# Patient Record
Sex: FEMALE
[De-identification: names, ages, dates, MRNs, and addresses within clinical notes are randomized per-mention and may not be internally consistent; named-entity substitution may affect disease eponyms.]

---

## 2023-06-10 ENCOUNTER — NURSE TRIAGE (OUTPATIENT)
Dept: OTHER | Facility: CLINIC | Age: 88
End: 2023-06-10

## 2023-06-10 NOTE — TELEPHONE ENCOUNTER
Location of patient: [place]    Subjective: Caller states \"fell on Tuesday\" Daughter, Alba Quesada, is calling, patient is nearby. Current Symptoms: Was seen by the doctor the day after she fell and was given a pain shot. Patient has back pain in her whole back. Hurts to take a deep breath in her back. Associated Symptoms: reduced activity    Pain Severity: 10/10; sharp; intermittent with movement    Temperature: denies fever     What has been tried: tramadol, tylenol    Recommended disposition: See HCP (or PCP triage) within 4 hours    Care advice provided, patient verbalizes understanding; denies any other questions or concerns. Outcome: Writer called the on-call Dr Armen Benítez and conferenced him to the call with daughter.  is going to send Tylenol 3 into pharmacy at patient's assisted living facility to get her through until Monday. Daughter agreed with this plan. Writer unable to tell daughter when to call back as she disconnected.         This triage is a result of a call to the [address]      Reason for Disposition   [1] SEVERE pain (e.g., excruciating) AND [2] not improved 2 hours after pain medicine/ice packs    Protocols used: Back Injury-ADULT-